# Patient Record
Sex: MALE | Race: OTHER | NOT HISPANIC OR LATINO
[De-identification: names, ages, dates, MRNs, and addresses within clinical notes are randomized per-mention and may not be internally consistent; named-entity substitution may affect disease eponyms.]

---

## 2019-07-15 PROBLEM — Z00.00 ENCOUNTER FOR PREVENTIVE HEALTH EXAMINATION: Status: ACTIVE | Noted: 2019-07-15

## 2019-07-22 ENCOUNTER — APPOINTMENT (OUTPATIENT)
Dept: CT IMAGING | Facility: HOSPITAL | Age: 74
End: 2019-07-22
Payer: MEDICARE

## 2019-07-22 ENCOUNTER — OUTPATIENT (OUTPATIENT)
Dept: OUTPATIENT SERVICES | Facility: HOSPITAL | Age: 74
LOS: 1 days | End: 2019-07-22
Payer: MEDICARE

## 2019-07-22 PROCEDURE — 75574 CT ANGIO HRT W/3D IMAGE: CPT

## 2019-07-22 PROCEDURE — 75574 CT ANGIO HRT W/3D IMAGE: CPT | Mod: 26

## 2019-07-31 VITALS
HEIGHT: 67 IN | TEMPERATURE: 98 F | DIASTOLIC BLOOD PRESSURE: 67 MMHG | SYSTOLIC BLOOD PRESSURE: 122 MMHG | WEIGHT: 214.95 LBS | RESPIRATION RATE: 16 BRPM | HEART RATE: 52 BPM | OXYGEN SATURATION: 97 %

## 2019-07-31 RX ORDER — CHLORHEXIDINE GLUCONATE 213 G/1000ML
1 SOLUTION TOPICAL ONCE
Refills: 0 | Status: DISCONTINUED | OUTPATIENT
Start: 2019-08-01 | End: 2019-08-01

## 2019-07-31 NOTE — H&P ADULT - NSHPLABSRESULTS_GEN_ALL_CORE
13.3   5.76  )-----------( 188      ( 01 Aug 2019 11:49 )             41.0     143  |  107  |  17  ----------------------------<  127<H>  3.8   |  24  |  1.11    Ca    10.0      01 Aug 2019 11:49    TPro  7.8  /  Alb  4.3  /  TBili  0.7  /  DBili  x   /  AST  42<H>  /  ALT  57<H>  /  AlkPhos  84  08-01    PT/INR - ( 01 Aug 2019 11:49 )   PT: 12.8 sec;   INR: 1.13        PTT - ( 01 Aug 2019 11:49 )  PTT:32.7 sec    CARDIAC MARKERS ( 01 Aug 2019 11:49 )  x     / x     / 211 U/L / x     / 3.2 ng/mL    Hemoglobin A1C, Whole Blood: 6.6 % (08-01-19 @ 11:49)    EKG: NSR at 50bpm, T wave inversion in V1, no ST elevations noted.

## 2019-07-31 NOTE — H&P ADULT - NSICDXPASTMEDICALHX_GEN_ALL_CORE_FT
PAST MEDICAL HISTORY:  CAD, multiple vessel     DM (diabetes mellitus)     GERD (gastroesophageal reflux disease)     History of BPH     HLD (hyperlipidemia)     HTN (hypertension)     JOVANNY on CPAP

## 2019-07-31 NOTE — H&P ADULT - HISTORY OF PRESENT ILLNESS
please confirm medications on arrival  72 y/o Male with a FHx of CAD and a PMHx of HTN, HLD, DM-2, GERD, JOVANNY (on CPAP at home), BPH s/p Alight Scraping, PTSD, and CAD who presented to his Cardiologist, Dr. Guillaume, with c/o sharp Left sided chest pain with associated Right arm paresthesias, palpitations, dyspnea on exertion (upon ambulation of one flight/blocks), and h/o syncopal episodes (most recently a year ago), with subsequent CTA Coronaries 7/22/2019: revealing LM: 50%, pLCx: severe calcific stenosis at Ostium, OM2: severe stenosis at Ostium, LAD: moderate, mLCx: small, OM1: minimal stenosis high takeoff, pRCA: mild, mRCA: moderate, dRCA: mild, RPDA: minimal, RPL: normal, Calcium Score 1145 (94%). Patient denies diaphoresis, fatigue, dizziness, lower extremity edema, orthopnea, positional nocturnal dyspnea, recent fever, chills, night sweats, nausea, vomiting, and diarrhea. In light of patient's risk factors, CCS Class III symptoms, and abnormal CTA Coronaries, patient is now referred to [facility] for Cardiac Catheterization with possible intervention if clinically indicated. 72 y/o Male with a FHx of CAD and a PMHx of HTN, HLD, DM-2, GERD, JOVANNY (on CPAP at home), BPH s/p Alight Scraping, PTSD, and CAD who presented to his Cardiologist, Dr. Guillaume, with c/o sharp Left sided chest pain with associated Right arm paresthesias, palpitations, dyspnea on exertion (upon ambulation of one flight/blocks), and h/o syncopal episodes (most recently a year ago), with subsequent CTA Coronaries 7/22/2019: revealing LM: 50%, pLCx: severe calcific stenosis at Ostium, OM2: severe stenosis at Ostium, LAD: moderate, mLCx: small, OM1: minimal stenosis high takeoff, pRCA: mild, mRCA: moderate, dRCA: mild, RPDA: minimal, RPL: normal, Calcium Score 1145 (94%). Patient denies diaphoresis, fatigue, dizziness, lower extremity edema, orthopnea, positional nocturnal dyspnea, recent fever, chills, night sweats, nausea, vomiting, and diarrhea. In light of patient's risk factors, CCS Class III symptoms, and abnormal CTA Coronaries, patient is now referred to [facility] for Cardiac Catheterization with possible intervention if clinically indicated. n/a

## 2019-07-31 NOTE — H&P ADULT - ASSESSMENT
H/H 13.3/41, patient denies bleeding, GI bleeding, hematemesis, hematuria, BRBPR, and melena. Patient reports compliance with home Aspirin 81mg daily, Plavix 600mg and Aspirin 243mg ordered pre-Cath.    Cr. 1.11, euvolemic, IV hydration 9% NS @ 75cc/hr ordered pre-Cath.    ASA Class III    Risks & benefits of procedure and alternative therapy have been explained to the patient including but not limited to: allergic reaction, bleeding with possible need for blood transfusion, infection, renal and vascular compromise, limb damage, arrhythmia, stroke, vessel dissection/perforation, Myocardial Infarction, emergent CABG. Informed consent obtained and in chart.    Patient a candidate for sedation: Yes    Sedation Type: Moderate 72 y/o Male with a FHx of CAD and a PMHx of HTN, HLD, DM-2, GERD, JOVANNY (on CPAP at home), BPH s/p Alight Scraping, PTSD, and CAD who presented to his Cardiologist with CCS Class III symptoms with subsequent CTA Coronaries and now presents for Cardiac Catheterization with possible intervention if clinically indicated.    H/H 13.3/41, patient denies bleeding, GI bleeding, hematemesis, hematuria, BRBPR, and melena. Patient reports compliance with home Aspirin 81mg daily, Plavix 600mg and Aspirin 243mg ordered pre-Cath.    Cr. 1.11, euvolemic, IV hydration 9% NS @ 75cc/hr ordered pre-Cath.    ASA Class III    Risks & benefits of procedure and alternative therapy have been explained to the patient including but not limited to: allergic reaction, bleeding with possible need for blood transfusion, infection, renal and vascular compromise, limb damage, arrhythmia, stroke, vessel dissection/perforation, Myocardial Infarction, emergent CABG. Informed consent obtained and in chart.    Patient a candidate for sedation: Yes    Sedation Type: Moderate

## 2019-08-01 ENCOUNTER — INPATIENT (INPATIENT)
Facility: HOSPITAL | Age: 74
LOS: 0 days | Discharge: HOME | DRG: 247 | End: 2019-08-02
Attending: INTERNAL MEDICINE | Admitting: INTERNAL MEDICINE
Payer: COMMERCIAL

## 2019-08-01 DIAGNOSIS — Z98.52 VASECTOMY STATUS: Chronic | ICD-10-CM

## 2019-08-01 DIAGNOSIS — I25.110 ATHEROSCLEROTIC HEART DISEASE OF NATIVE CORONARY ARTERY WITH UNSTABLE ANGINA PECTORIS: ICD-10-CM

## 2019-08-01 DIAGNOSIS — Z82.49 FAMILY HISTORY OF ISCHEMIC HEART DISEASE AND OTHER DISEASES OF THE CIRCULATORY SYSTEM: ICD-10-CM

## 2019-08-01 DIAGNOSIS — N40.0 BENIGN PROSTATIC HYPERPLASIA WITHOUT LOWER URINARY TRACT SYMPTOMS: ICD-10-CM

## 2019-08-01 DIAGNOSIS — Z90.89 ACQUIRED ABSENCE OF OTHER ORGANS: Chronic | ICD-10-CM

## 2019-08-01 DIAGNOSIS — E11.9 TYPE 2 DIABETES MELLITUS WITHOUT COMPLICATIONS: ICD-10-CM

## 2019-08-01 DIAGNOSIS — Z79.82 LONG TERM (CURRENT) USE OF ASPIRIN: ICD-10-CM

## 2019-08-01 DIAGNOSIS — Z99.89 DEPENDENCE ON OTHER ENABLING MACHINES AND DEVICES: ICD-10-CM

## 2019-08-01 DIAGNOSIS — E78.5 HYPERLIPIDEMIA, UNSPECIFIED: ICD-10-CM

## 2019-08-01 DIAGNOSIS — K21.9 GASTRO-ESOPHAGEAL REFLUX DISEASE WITHOUT ESOPHAGITIS: ICD-10-CM

## 2019-08-01 DIAGNOSIS — F43.10 POST-TRAUMATIC STRESS DISORDER, UNSPECIFIED: ICD-10-CM

## 2019-08-01 DIAGNOSIS — I10 ESSENTIAL (PRIMARY) HYPERTENSION: ICD-10-CM

## 2019-08-01 DIAGNOSIS — G47.33 OBSTRUCTIVE SLEEP APNEA (ADULT) (PEDIATRIC): ICD-10-CM

## 2019-08-01 LAB
ALBUMIN SERPL ELPH-MCNC: 4.3 G/DL — SIGNIFICANT CHANGE UP (ref 3.3–5)
ALP SERPL-CCNC: 84 U/L — SIGNIFICANT CHANGE UP (ref 40–120)
ALT FLD-CCNC: 57 U/L — HIGH (ref 10–45)
ANION GAP SERPL CALC-SCNC: 12 MMOL/L — SIGNIFICANT CHANGE UP (ref 5–17)
APTT BLD: 32.7 SEC — SIGNIFICANT CHANGE UP (ref 27.5–36.3)
AST SERPL-CCNC: 42 U/L — HIGH (ref 10–40)
BASOPHILS # BLD AUTO: 0.01 K/UL — SIGNIFICANT CHANGE UP (ref 0–0.2)
BASOPHILS NFR BLD AUTO: 0.2 % — SIGNIFICANT CHANGE UP (ref 0–2)
BILIRUB SERPL-MCNC: 0.7 MG/DL — SIGNIFICANT CHANGE UP (ref 0.2–1.2)
BUN SERPL-MCNC: 17 MG/DL — SIGNIFICANT CHANGE UP (ref 7–23)
CALCIUM SERPL-MCNC: 10 MG/DL — SIGNIFICANT CHANGE UP (ref 8.4–10.5)
CHLORIDE SERPL-SCNC: 107 MMOL/L — SIGNIFICANT CHANGE UP (ref 96–108)
CHOLEST SERPL-MCNC: 122 MG/DL — SIGNIFICANT CHANGE UP (ref 10–199)
CK MB CFR SERPL CALC: 3.2 NG/ML — SIGNIFICANT CHANGE UP (ref 0–6.7)
CK SERPL-CCNC: 211 U/L — HIGH (ref 30–200)
CO2 SERPL-SCNC: 24 MMOL/L — SIGNIFICANT CHANGE UP (ref 22–31)
CREAT SERPL-MCNC: 1.11 MG/DL — SIGNIFICANT CHANGE UP (ref 0.5–1.3)
CRP SERPL-MCNC: 0.1 MG/DL — SIGNIFICANT CHANGE UP (ref 0–0.4)
EOSINOPHIL # BLD AUTO: 0.07 K/UL — SIGNIFICANT CHANGE UP (ref 0–0.5)
EOSINOPHIL NFR BLD AUTO: 1.2 % — SIGNIFICANT CHANGE UP (ref 0–6)
GLUCOSE BLDC GLUCOMTR-MCNC: 100 MG/DL — HIGH (ref 70–99)
GLUCOSE BLDC GLUCOMTR-MCNC: 118 MG/DL — HIGH (ref 70–99)
GLUCOSE SERPL-MCNC: 127 MG/DL — HIGH (ref 70–99)
HBA1C BLD-MCNC: 6.6 % — HIGH (ref 4–5.6)
HCT VFR BLD CALC: 41 % — SIGNIFICANT CHANGE UP (ref 39–50)
HDLC SERPL-MCNC: 35 MG/DL — LOW
HGB BLD-MCNC: 13.3 G/DL — SIGNIFICANT CHANGE UP (ref 13–17)
IMM GRANULOCYTES NFR BLD AUTO: 0.3 % — SIGNIFICANT CHANGE UP (ref 0–1.5)
INR BLD: 1.13 — SIGNIFICANT CHANGE UP (ref 0.88–1.16)
LIPID PNL WITH DIRECT LDL SERPL: 57 MG/DL — SIGNIFICANT CHANGE UP
LYMPHOCYTES # BLD AUTO: 1.3 K/UL — SIGNIFICANT CHANGE UP (ref 1–3.3)
LYMPHOCYTES # BLD AUTO: 22.6 % — SIGNIFICANT CHANGE UP (ref 13–44)
MCHC RBC-ENTMCNC: 31.1 PG — SIGNIFICANT CHANGE UP (ref 27–34)
MCHC RBC-ENTMCNC: 32.4 GM/DL — SIGNIFICANT CHANGE UP (ref 32–36)
MCV RBC AUTO: 95.8 FL — SIGNIFICANT CHANGE UP (ref 80–100)
MONOCYTES # BLD AUTO: 0.31 K/UL — SIGNIFICANT CHANGE UP (ref 0–0.9)
MONOCYTES NFR BLD AUTO: 5.4 % — SIGNIFICANT CHANGE UP (ref 2–14)
NEUTROPHILS # BLD AUTO: 4.05 K/UL — SIGNIFICANT CHANGE UP (ref 1.8–7.4)
NEUTROPHILS NFR BLD AUTO: 70.3 % — SIGNIFICANT CHANGE UP (ref 43–77)
NRBC # BLD: 0 /100 WBCS — SIGNIFICANT CHANGE UP (ref 0–0)
PLATELET # BLD AUTO: 188 K/UL — SIGNIFICANT CHANGE UP (ref 150–400)
POTASSIUM SERPL-MCNC: 3.8 MMOL/L — SIGNIFICANT CHANGE UP (ref 3.5–5.3)
POTASSIUM SERPL-SCNC: 3.8 MMOL/L — SIGNIFICANT CHANGE UP (ref 3.5–5.3)
PROT SERPL-MCNC: 7.8 G/DL — SIGNIFICANT CHANGE UP (ref 6–8.3)
PROTHROM AB SERPL-ACNC: 12.8 SEC — SIGNIFICANT CHANGE UP (ref 10–12.9)
RBC # BLD: 4.28 M/UL — SIGNIFICANT CHANGE UP (ref 4.2–5.8)
RBC # FLD: 13.3 % — SIGNIFICANT CHANGE UP (ref 10.3–14.5)
SODIUM SERPL-SCNC: 143 MMOL/L — SIGNIFICANT CHANGE UP (ref 135–145)
TOTAL CHOLESTEROL/HDL RATIO MEASUREMENT: 3.5 RATIO — SIGNIFICANT CHANGE UP (ref 3.4–9.6)
TRIGL SERPL-MCNC: 150 MG/DL — HIGH (ref 10–149)
WBC # BLD: 5.76 K/UL — SIGNIFICANT CHANGE UP (ref 3.8–10.5)
WBC # FLD AUTO: 5.76 K/UL — SIGNIFICANT CHANGE UP (ref 3.8–10.5)

## 2019-08-01 PROCEDURE — 93571 IV DOP VEL&/PRESS C FLO 1ST: CPT | Mod: 26,LC

## 2019-08-01 PROCEDURE — 92929: CPT | Mod: LC

## 2019-08-01 PROCEDURE — 93010 ELECTROCARDIOGRAM REPORT: CPT

## 2019-08-01 PROCEDURE — 93458 L HRT ARTERY/VENTRICLE ANGIO: CPT | Mod: 26,59

## 2019-08-01 PROCEDURE — 92928 PRQ TCAT PLMT NTRAC ST 1 LES: CPT | Mod: LC

## 2019-08-01 RX ORDER — DEXTROSE 50 % IN WATER 50 %
25 SYRINGE (ML) INTRAVENOUS ONCE
Refills: 0 | Status: DISCONTINUED | OUTPATIENT
Start: 2019-08-01 | End: 2019-08-01

## 2019-08-01 RX ORDER — ALBUTEROL 90 UG/1
2 AEROSOL, METERED ORAL EVERY 6 HOURS
Refills: 0 | Status: DISCONTINUED | OUTPATIENT
Start: 2019-08-01 | End: 2019-08-02

## 2019-08-01 RX ORDER — GABAPENTIN 400 MG/1
400 CAPSULE ORAL DAILY
Refills: 0 | Status: DISCONTINUED | OUTPATIENT
Start: 2019-08-01 | End: 2019-08-02

## 2019-08-01 RX ORDER — ALLOPURINOL 300 MG
100 TABLET ORAL DAILY
Refills: 0 | Status: DISCONTINUED | OUTPATIENT
Start: 2019-08-01 | End: 2019-08-02

## 2019-08-01 RX ORDER — ALBUTEROL 90 UG/1
2 AEROSOL, METERED ORAL
Qty: 0 | Refills: 0 | DISCHARGE

## 2019-08-01 RX ORDER — SODIUM CHLORIDE 9 MG/ML
1000 INJECTION, SOLUTION INTRAVENOUS
Refills: 0 | Status: DISCONTINUED | OUTPATIENT
Start: 2019-08-01 | End: 2019-08-01

## 2019-08-01 RX ORDER — ACETAMINOPHEN 500 MG
650 TABLET ORAL ONCE
Refills: 0 | Status: COMPLETED | OUTPATIENT
Start: 2019-08-01 | End: 2019-08-02

## 2019-08-01 RX ORDER — ATENOLOL 25 MG/1
25 TABLET ORAL DAILY
Refills: 0 | Status: DISCONTINUED | OUTPATIENT
Start: 2019-08-01 | End: 2019-08-02

## 2019-08-01 RX ORDER — ZOLPIDEM TARTRATE 10 MG/1
5 TABLET ORAL AT BEDTIME
Refills: 0 | Status: DISCONTINUED | OUTPATIENT
Start: 2019-08-01 | End: 2019-08-02

## 2019-08-01 RX ORDER — PANTOPRAZOLE SODIUM 20 MG/1
1 TABLET, DELAYED RELEASE ORAL
Qty: 0 | Refills: 0 | DISCHARGE

## 2019-08-01 RX ORDER — SODIUM CHLORIDE 9 MG/ML
500 INJECTION INTRAMUSCULAR; INTRAVENOUS; SUBCUTANEOUS
Refills: 0 | Status: DISCONTINUED | OUTPATIENT
Start: 2019-08-01 | End: 2019-08-02

## 2019-08-01 RX ORDER — SERTRALINE 25 MG/1
2 TABLET, FILM COATED ORAL
Qty: 0 | Refills: 0 | DISCHARGE

## 2019-08-01 RX ORDER — TIZANIDINE 4 MG/1
4 TABLET ORAL
Refills: 0 | Status: DISCONTINUED | OUTPATIENT
Start: 2019-08-01 | End: 2019-08-02

## 2019-08-01 RX ORDER — SERTRALINE 25 MG/1
1 TABLET, FILM COATED ORAL
Qty: 0 | Refills: 0 | DISCHARGE

## 2019-08-01 RX ORDER — TAMSULOSIN HYDROCHLORIDE 0.4 MG/1
0.4 CAPSULE ORAL AT BEDTIME
Refills: 0 | Status: DISCONTINUED | OUTPATIENT
Start: 2019-08-01 | End: 2019-08-02

## 2019-08-01 RX ORDER — GLUCAGON INJECTION, SOLUTION 0.5 MG/.1ML
1 INJECTION, SOLUTION SUBCUTANEOUS ONCE
Refills: 0 | Status: DISCONTINUED | OUTPATIENT
Start: 2019-08-01 | End: 2019-08-02

## 2019-08-01 RX ORDER — SODIUM CHLORIDE 9 MG/ML
1000 INJECTION, SOLUTION INTRAVENOUS
Refills: 0 | Status: DISCONTINUED | OUTPATIENT
Start: 2019-08-01 | End: 2019-08-02

## 2019-08-01 RX ORDER — CLOPIDOGREL BISULFATE 75 MG/1
600 TABLET, FILM COATED ORAL ONCE
Refills: 0 | Status: COMPLETED | OUTPATIENT
Start: 2019-08-01 | End: 2019-08-01

## 2019-08-01 RX ORDER — DEXTROSE 50 % IN WATER 50 %
15 SYRINGE (ML) INTRAVENOUS ONCE
Refills: 0 | Status: DISCONTINUED | OUTPATIENT
Start: 2019-08-01 | End: 2019-08-02

## 2019-08-01 RX ORDER — ALLOPURINOL 300 MG
1 TABLET ORAL
Qty: 0 | Refills: 0 | DISCHARGE

## 2019-08-01 RX ORDER — TAMSULOSIN HYDROCHLORIDE 0.4 MG/1
1 CAPSULE ORAL
Qty: 0 | Refills: 0 | DISCHARGE

## 2019-08-01 RX ORDER — DEXTROSE 50 % IN WATER 50 %
12.5 SYRINGE (ML) INTRAVENOUS ONCE
Refills: 0 | Status: DISCONTINUED | OUTPATIENT
Start: 2019-08-01 | End: 2019-08-02

## 2019-08-01 RX ORDER — CLOPIDOGREL BISULFATE 75 MG/1
75 TABLET, FILM COATED ORAL DAILY
Refills: 0 | Status: DISCONTINUED | OUTPATIENT
Start: 2019-08-02 | End: 2019-08-02

## 2019-08-01 RX ORDER — GABAPENTIN 400 MG/1
1 CAPSULE ORAL
Qty: 0 | Refills: 0 | DISCHARGE

## 2019-08-01 RX ORDER — ASPIRIN/CALCIUM CARB/MAGNESIUM 324 MG
81 TABLET ORAL DAILY
Refills: 0 | Status: DISCONTINUED | OUTPATIENT
Start: 2019-08-02 | End: 2019-08-02

## 2019-08-01 RX ORDER — ATORVASTATIN CALCIUM 80 MG/1
1 TABLET, FILM COATED ORAL
Qty: 0 | Refills: 0 | DISCHARGE

## 2019-08-01 RX ORDER — ALPROSTADIL 125 MCG
1 SUPPOSITORY, URETHRAL URETHRAL
Qty: 0 | Refills: 0 | DISCHARGE

## 2019-08-01 RX ORDER — TIZANIDINE 4 MG/1
1 TABLET ORAL
Qty: 0 | Refills: 0 | DISCHARGE

## 2019-08-01 RX ORDER — LISINOPRIL 2.5 MG/1
20 TABLET ORAL DAILY
Refills: 0 | Status: DISCONTINUED | OUTPATIENT
Start: 2019-08-02 | End: 2019-08-02

## 2019-08-01 RX ORDER — PANTOPRAZOLE SODIUM 20 MG/1
40 TABLET, DELAYED RELEASE ORAL
Refills: 0 | Status: DISCONTINUED | OUTPATIENT
Start: 2019-08-01 | End: 2019-08-02

## 2019-08-01 RX ORDER — SODIUM CHLORIDE 9 MG/ML
1000 INJECTION INTRAMUSCULAR; INTRAVENOUS; SUBCUTANEOUS
Refills: 0 | Status: DISCONTINUED | OUTPATIENT
Start: 2019-08-01 | End: 2019-08-01

## 2019-08-01 RX ORDER — ZOLPIDEM TARTRATE 10 MG/1
1 TABLET ORAL
Qty: 0 | Refills: 0 | DISCHARGE

## 2019-08-01 RX ORDER — CHOLECALCIFEROL (VITAMIN D3) 125 MCG
1 CAPSULE ORAL
Qty: 0 | Refills: 0 | DISCHARGE

## 2019-08-01 RX ORDER — DEXTROSE 50 % IN WATER 50 %
12.5 SYRINGE (ML) INTRAVENOUS ONCE
Refills: 0 | Status: DISCONTINUED | OUTPATIENT
Start: 2019-08-01 | End: 2019-08-01

## 2019-08-01 RX ORDER — DEXTROSE 50 % IN WATER 50 %
15 SYRINGE (ML) INTRAVENOUS ONCE
Refills: 0 | Status: DISCONTINUED | OUTPATIENT
Start: 2019-08-01 | End: 2019-08-01

## 2019-08-01 RX ORDER — HYDROCHLOROTHIAZIDE 25 MG
25 TABLET ORAL DAILY
Refills: 0 | Status: DISCONTINUED | OUTPATIENT
Start: 2019-08-02 | End: 2019-08-02

## 2019-08-01 RX ORDER — DEXTROSE 50 % IN WATER 50 %
25 SYRINGE (ML) INTRAVENOUS ONCE
Refills: 0 | Status: DISCONTINUED | OUTPATIENT
Start: 2019-08-01 | End: 2019-08-02

## 2019-08-01 RX ORDER — ATORVASTATIN CALCIUM 80 MG/1
40 TABLET, FILM COATED ORAL AT BEDTIME
Refills: 0 | Status: DISCONTINUED | OUTPATIENT
Start: 2019-08-01 | End: 2019-08-02

## 2019-08-01 RX ORDER — GLUCAGON INJECTION, SOLUTION 0.5 MG/.1ML
1 INJECTION, SOLUTION SUBCUTANEOUS ONCE
Refills: 0 | Status: DISCONTINUED | OUTPATIENT
Start: 2019-08-01 | End: 2019-08-01

## 2019-08-01 RX ORDER — INSULIN LISPRO 100/ML
VIAL (ML) SUBCUTANEOUS ONCE
Refills: 0 | Status: DISCONTINUED | OUTPATIENT
Start: 2019-08-01 | End: 2019-08-01

## 2019-08-01 RX ORDER — INSULIN LISPRO 100/ML
VIAL (ML) SUBCUTANEOUS
Refills: 0 | Status: DISCONTINUED | OUTPATIENT
Start: 2019-08-01 | End: 2019-08-02

## 2019-08-01 RX ORDER — ATENOLOL 25 MG/1
1 TABLET ORAL
Qty: 0 | Refills: 0 | DISCHARGE

## 2019-08-01 RX ORDER — ASPIRIN/CALCIUM CARB/MAGNESIUM 324 MG
81 TABLET ORAL DAILY
Refills: 0 | Status: DISCONTINUED | OUTPATIENT
Start: 2019-08-01 | End: 2019-08-01

## 2019-08-01 RX ORDER — ASPIRIN/CALCIUM CARB/MAGNESIUM 324 MG
243 TABLET ORAL ONCE
Refills: 0 | Status: COMPLETED | OUTPATIENT
Start: 2019-08-01 | End: 2019-08-01

## 2019-08-01 RX ORDER — SERTRALINE 25 MG/1
200 TABLET, FILM COATED ORAL DAILY
Refills: 0 | Status: DISCONTINUED | OUTPATIENT
Start: 2019-08-01 | End: 2019-08-02

## 2019-08-01 RX ADMIN — ZOLPIDEM TARTRATE 5 MILLIGRAM(S): 10 TABLET ORAL at 22:33

## 2019-08-01 RX ADMIN — CLOPIDOGREL BISULFATE 600 MILLIGRAM(S): 75 TABLET, FILM COATED ORAL at 13:23

## 2019-08-01 RX ADMIN — TAMSULOSIN HYDROCHLORIDE 0.4 MILLIGRAM(S): 0.4 CAPSULE ORAL at 22:33

## 2019-08-01 RX ADMIN — Medication 243 MILLIGRAM(S): at 13:23

## 2019-08-01 RX ADMIN — SODIUM CHLORIDE 75 MILLILITER(S): 9 INJECTION INTRAMUSCULAR; INTRAVENOUS; SUBCUTANEOUS at 13:23

## 2019-08-01 RX ADMIN — ATORVASTATIN CALCIUM 40 MILLIGRAM(S): 80 TABLET, FILM COATED ORAL at 22:33

## 2019-08-01 NOTE — CONSULT NOTE ADULT - SUBJECTIVE AND OBJECTIVE BOX
Preventive Cardiology Consultation Note    Cardiologist - Dr. Forman     CHIEF COMPLAINT: s/p cardiac catheterization requiring cardiovascular prevention optimization and education    HISTORY OF PRESENT ILLNESS: 72 y/o Male with a FHx of CAD and a PMHx of HTN, HLD, DM-2, GERD, JOVANNY (on CPAP at home), BPH s/p Alight Scraping, PTSD, and CAD. CTA Coronaries 7/22/2019: revealing LM: 50%, pLCx: severe calcific stenosis at Ostium, OM2: severe stenosis at Ostium, LAD: moderate, mLCx: small, OM1: minimal stenosis high takeoff, pRCA: mild, mRCA: moderate, dRCA: mild, RPDA: minimal, RPL: normal, Calcium Score 1145 (94%). Patient is now s/p cardiac cath today 8/1/19    Review of systems otherwise negative.     Lifestyle History:  Mediterranean Diet Score (9 question survey) was 4.   (8-9: optimal, 6-7: near-optimal, 4-5: suboptimal, 0-3: markedly suboptimal)  Exercise: Patient denies any regular exercise other than walking necessary for daily activities.   Smoking: Patient denies any history of smoking.   Stress: Patient denies any stress.     PAST MEDICAL & SURGICAL HISTORY:  CAD, multiple vessel  History of BPH  JOVANNY on CPAP  GERD (gastroesophageal reflux disease)  DM (diabetes mellitus)  HLD (hyperlipidemia)  HTN (hypertension)  H/O uvulectomy  H/O vasectomy    FAMILY HISTORY:   MI (myocardial infarction), father.    Allergies:   No Known Allergies      HOME MEDICATIONS:  albuterol 90 mcg/inh inhalation aerosol with adapter: 2 puff(s) inhaled 4 times a day, As Needed (01 Aug 2019 12:21)  allopurinol 100 mg oral tablet: 1 tab(s) orally once a day (01 Aug 2019 12:21)  alprostadil 40 mcg injection: 1 unit(s) injectable 3 times a week, As Needed (01 Aug 2019 12:21)  Aspirin Enteric Coated 81 mg oral delayed release tablet: 1 tab(s) orally once a day (01 Aug 2019 12:21)  atenolol 25 mg oral tablet: 1 tab(s) orally once a day (01 Aug 2019 12:21)  atorvastatin 40 mg oral tablet: 1 tab(s) orally once a day (at bedtime) (01 Aug 2019 12:21)  Calcium 600+D 600 mg-200 intl units oral tablet: 1 tab(s) orally 2 times a day (01 Aug 2019 12:21)  cholecalciferol 2000 intl units oral tablet: 1 tab(s) orally once a day (01 Aug 2019 12:21)  gabapentin 400 mg oral capsule: 1 cap(s) orally once a day (01 Aug 2019 12:21)  lisinopril-hydrochlorothiazide 20 mg-25 mg oral tablet: 1 tab(s) orally once a day (01 Aug 2019 12:21)  metFORMIN 500 mg oral tablet: 1 tab(s) orally 2 times a day (01 Aug 2019 12:21)  pantoprazole 40 mg oral delayed release tablet: 1 tab(s) orally once a day (01 Aug 2019 12:21)  sertraline 100 mg oral tablet: 2 tab(s) orally once a day (01 Aug 2019 12:21)  tamsulosin 0.4 mg oral capsule: 1 cap(s) orally once a day (at bedtime) (01 Aug 2019 12:21)  tiZANidine 4 mg oral tablet: 1 tab(s) orally 2 times a day, As Needed (01 Aug 2019 12:21)  zolpidem 10 mg oral tablet: 1 tab(s) orally once a day (at bedtime) (01 Aug 2019 12:21)      PHYSICAL EXAM:  · Temp (F)	98 Degrees F	  · Temp (C)	36.7 Degrees C	  · Heart Rate	  52 /min	  · Respiration Rate (breaths/min)	16 /min	  · BP Systolic	122 mm Hg	  · BP Diastolic	67 mm Hg	  · Blood Pressure - Method	electronic	  · BP Noninvasive Mean	85 mm Hg	  · SpO2 (%)	97 %	  · O2 delivery	room air	    Height (cm): 170.18 (08-01 @ 12:25)  Weight (kg): 97.5 (08-01 @ 12:25)  BMI (kg/m2): 33.7 (08-01 @ 12:25)  	  Gen- awake, conversive  Head-NCAT; eyes: no corneal arcus noted b/l; no xanthelasmas   Neck- no thyromegaly, no cervical LAD, no JVD, no carotid bruit b/l  Respiratory- good air entry b/l, no WRR  Cardiovascular- S1S2, RRR, no MRG appr, no LE edema b/l, distal pulses 2+ b/l  Gastrointestinal- no HSM, no masses  Neurology- moves all extremities, no focal deficits  Psych- normal affect, non-depressed mood  Skin- no lesions, no rashes, no xanthomas     LABS:	                        13.3   5.76  )-----------( 188      ( 01 Aug 2019 11:49 )             41.0     08-01    143  |  107  |  17  ----------------------------<  127<H>  3.8   |  24  |  1.11    Ca    10.0      01 Aug 2019 11:49    TPro  7.8  /  Alb  4.3  /  TBili  0.7  /  DBili  x   /  AST  42<H>  /  ALT  57<H>  /  AlkPhos  84  08-01      Hemoglobin A1C, Whole Blood: 6.6 % (08-01 @ 11:49)      Cholesterol, Serum: 122 mg/dL (08-01 @ 11:49)  HDL Cholesterol, Serum: 35 mg/dL (08-01 @ 11:49)  Triglycerides, Serum: 150 mg/dL (08-01 @ 11:49)  Direct LDL: 57 mg/dL (08-01 @ 11:49)    C-Reactive Protein, Serum: 0.10 mg/dL (08-01 @ 11:49)      ASSESSMENT/RECOMMENDATIONS: 	  Patient's dietary, exercise and overall lifestyle habits were reviewed. The concept of atherosclerosis and its systemic nature was discussed with a focus on the need to get all cardiovascular risk factors to goal. At this time, I would like to make the following recommendations to optimize atherosclerotic risk factors.     RECOMMENDATIONS:   Anti-platelet Therapy: DAPT per interventionalist recommendation.   Lipid Therapy: Patient is currently taking atorvastatin 40mg daily and is compliant and tolerating it well. We believe this is an appropriate medication for him at this time, as his current LDL level is at goal and lifestyle modifications will likely benefit him further. Depending on improvements from lifestyle modifications, it would be reasonable to consider either increasing the statin dosage and/or adding Zetia 10mg daily to further optimize his medication regimen, if needed in the future to keep his LDL level <70 mg/dL.   Hypertension: Blood pressures during this stay were well-controlled.   Mediterranean Diet Score is 4. Some suggestions include continue incorporating 2 or more servings per day of vegetables, fruits, and whole grains. Increase intake of fish and legumes/beans to 2 or more servings per week. Aim to increase intake of healthy fats, such as olive oil and avocados, and have a handful of nuts/seeds most days. Reduce red/processed meat consumption to 2 or fewer times per week.   Exercise: Recommended gradually increasing activity to 30-45 minutes most days of the week once cleared by referring cardiologist. Cardiac rehab might benefit this patient and is covered by major insurance plans (other than co-pays), please refer.   Medication Adherence: Patient has no issues with adherence at this time.   Smoking: This patient is a non-smoker.   Obesity/Overweight: The patient was advised about specific mechanisms such as reduced portions and increased activity for efforts toward weight loss.   Glucometabolic State: Patient's blood sugar is well-controlled on the current regimen at this time. Recent HbA1c was 6.6%. Depending on discussions with patient's PCP and/or endocrinologist, if medication adjustments are needed in the future, we would recommend the possibility of a GLP-1 agonist or SGLT-2 inhibitor, as these newer agents have cardiovascular benefits as well as glucose control.   Sleep Apnea: This patient has known JOVANNY and reports compliance with CPAP nightly.   Psychological Stress: The patient appears to be coping with stressors well at this time.     Thank you for the opportunity to see this patient. Please feel free to contact Prevention if there are any questions, or if you feel that your patient would benefit from continued follow-up visits with the Program.    I am acting as a scribe on behalf of Dr. Faye Mcdaniel,    Shefali Bustillos, [facility]  Cardiovascular Prevention     Faye Mcdaniel MD  System Director, Cardiovascular Prevention Preventive Cardiology Consultation Note    Cardiologist - Dr. Forman     CHIEF COMPLAINT: s/p cardiac catheterization requiring cardiovascular prevention optimization and education    HISTORY OF PRESENT ILLNESS: 74 y/o Male with a FHx of CAD and a PMHx of HTN, HLD, DM-2, GERD, JOVANNY (on CPAP at home), BPH s/p Alight Scraping, PTSD, and CAD. CTA Coronaries 7/22/2019: revealing LM: 50%, pLCx: severe calcific stenosis at Ostium, OM2: severe stenosis at Ostium, LAD: moderate, mLCx: small, OM1: minimal stenosis high takeoff, pRCA: mild, mRCA: moderate, dRCA: mild, RPDA: minimal, RPL: normal, Calcium Score 1145 (94%). Patient is now s/p cardiac cath today 8/1/19: LELA to ostial LCx and OM2. Residual mLAD 50%, dRCA 30%.     Review of systems otherwise negative.     Lifestyle History:  Mediterranean Diet Score (9 question survey) was 4.   (8-9: optimal, 6-7: near-optimal, 4-5: suboptimal, 0-3: markedly suboptimal)  Exercise: Patient denies any regular exercise other than walking necessary for daily activities.   Smoking: Patient denies any history of smoking.   Stress: Patient denies any stress.     PAST MEDICAL & SURGICAL HISTORY:  CAD, multiple vessel  History of BPH  JOVANNY on CPAP  GERD (gastroesophageal reflux disease)  DM (diabetes mellitus)  HLD (hyperlipidemia)  HTN (hypertension)  H/O uvulectomy  H/O vasectomy    FAMILY HISTORY:   MI (myocardial infarction), father.    Allergies:   No Known Allergies      HOME MEDICATIONS:  albuterol 90 mcg/inh inhalation aerosol with adapter: 2 puff(s) inhaled 4 times a day, As Needed (01 Aug 2019 12:21)  allopurinol 100 mg oral tablet: 1 tab(s) orally once a day (01 Aug 2019 12:21)  alprostadil 40 mcg injection: 1 unit(s) injectable 3 times a week, As Needed (01 Aug 2019 12:21)  Aspirin Enteric Coated 81 mg oral delayed release tablet: 1 tab(s) orally once a day (01 Aug 2019 12:21)  atenolol 25 mg oral tablet: 1 tab(s) orally once a day (01 Aug 2019 12:21)  atorvastatin 40 mg oral tablet: 1 tab(s) orally once a day (at bedtime) (01 Aug 2019 12:21)  Calcium 600+D 600 mg-200 intl units oral tablet: 1 tab(s) orally 2 times a day (01 Aug 2019 12:21)  cholecalciferol 2000 intl units oral tablet: 1 tab(s) orally once a day (01 Aug 2019 12:21)  gabapentin 400 mg oral capsule: 1 cap(s) orally once a day (01 Aug 2019 12:21)  lisinopril-hydrochlorothiazide 20 mg-25 mg oral tablet: 1 tab(s) orally once a day (01 Aug 2019 12:21)  metFORMIN 500 mg oral tablet: 1 tab(s) orally 2 times a day (01 Aug 2019 12:21)  pantoprazole 40 mg oral delayed release tablet: 1 tab(s) orally once a day (01 Aug 2019 12:21)  sertraline 100 mg oral tablet: 2 tab(s) orally once a day (01 Aug 2019 12:21)  tamsulosin 0.4 mg oral capsule: 1 cap(s) orally once a day (at bedtime) (01 Aug 2019 12:21)  tiZANidine 4 mg oral tablet: 1 tab(s) orally 2 times a day, As Needed (01 Aug 2019 12:21)  zolpidem 10 mg oral tablet: 1 tab(s) orally once a day (at bedtime) (01 Aug 2019 12:21)      PHYSICAL EXAM:  · Temp (F)	98 Degrees F	  · Temp (C)	36.7 Degrees C	  · Heart Rate	  52 /min	  · Respiration Rate (breaths/min)	16 /min	  · BP Systolic	122 mm Hg	  · BP Diastolic	67 mm Hg	  · Blood Pressure - Method	electronic	  · BP Noninvasive Mean	85 mm Hg	  · SpO2 (%)	97 %	  · O2 delivery	room air	    Height (cm): 170.18 (08-01 @ 12:25)  Weight (kg): 97.5 (08-01 @ 12:25)  BMI (kg/m2): 33.7 (08-01 @ 12:25)  	  Gen- awake, conversive  Head-NCAT; eyes: no corneal arcus noted b/l; no xanthelasmas   Neck- no thyromegaly, no cervical LAD, no JVD, no carotid bruit b/l  Respiratory- good air entry b/l, no WRR  Cardiovascular- S1S2, RRR, no MRG appr, no LE edema b/l, distal pulses 2+ b/l  Gastrointestinal- no HSM, no masses  Neurology- moves all extremities, no focal deficits  Psych- normal affect, non-depressed mood  Skin- no lesions, no rashes, no xanthomas     LABS:	                        13.3   5.76  )-----------( 188      ( 01 Aug 2019 11:49 )             41.0     08-01    143  |  107  |  17  ----------------------------<  127<H>  3.8   |  24  |  1.11    Ca    10.0      01 Aug 2019 11:49    TPro  7.8  /  Alb  4.3  /  TBili  0.7  /  DBili  x   /  AST  42<H>  /  ALT  57<H>  /  AlkPhos  84  08-01      Hemoglobin A1C, Whole Blood: 6.6 % (08-01 @ 11:49)      Cholesterol, Serum: 122 mg/dL (08-01 @ 11:49)  HDL Cholesterol, Serum: 35 mg/dL (08-01 @ 11:49)  Triglycerides, Serum: 150 mg/dL (08-01 @ 11:49)  Direct LDL: 57 mg/dL (08-01 @ 11:49)    C-Reactive Protein, Serum: 0.10 mg/dL (08-01 @ 11:49)      ASSESSMENT/RECOMMENDATIONS: 	  Patient's dietary, exercise and overall lifestyle habits were reviewed. The concept of atherosclerosis and its systemic nature was discussed with a focus on the need to get all cardiovascular risk factors to goal. At this time, I would like to make the following recommendations to optimize atherosclerotic risk factors.     RECOMMENDATIONS:   Anti-platelet Therapy: DAPT per interventionalist recommendation.   Lipid Therapy: Patient is currently taking atorvastatin 40mg daily and is compliant and tolerating it well. We believe this is an appropriate medication for him at this time, as his current LDL level is at goal and lifestyle modifications will likely benefit him further. Depending on improvements from lifestyle modifications, it would be reasonable to consider either increasing the statin dosage and/or adding Zetia 10mg daily to further optimize his medication regimen, if needed in the future to keep his LDL level <70 mg/dL.   Hypertension: Blood pressures during this stay were well-controlled.   Mediterranean Diet Score is 4. Some suggestions include continue incorporating 2 or more servings per day of vegetables, fruits, and whole grains. Increase intake of fish and legumes/beans to 2 or more servings per week. Aim to increase intake of healthy fats, such as olive oil and avocados, and have a handful of nuts/seeds most days. Reduce red/processed meat consumption to 2 or fewer times per week.   Exercise: Recommended gradually increasing activity to 30-45 minutes most days of the week once cleared by referring cardiologist. Cardiac rehab might benefit this patient and is covered by major insurance plans (other than co-pays), please refer.   Medication Adherence: Patient has no issues with adherence at this time.   Smoking: This patient is a non-smoker.   Obesity/Overweight: The patient was advised about specific mechanisms such as reduced portions and increased activity for efforts toward weight loss.   Glucometabolic State: Patient's blood sugar is well-controlled on the current regimen at this time. Recent HbA1c was 6.6%. Depending on discussions with patient's PCP and/or endocrinologist, if medication adjustments are needed in the future, we would recommend the possibility of a GLP-1 agonist or SGLT-2 inhibitor, as these newer agents have cardiovascular benefits as well as glucose control.   Sleep Apnea: This patient has known JOVANNY and reports compliance with CPAP nightly.   Psychological Stress: The patient appears to be coping with stressors well at this time.     Thank you for the opportunity to see this patient. Please feel free to contact Prevention if there are any questions, or if you feel that your patient would benefit from continued follow-up visits with the Program.    I am acting as a scribe on behalf of Dr. Faye Mcdaniel,    Shefali Bustillos, [facility]  Cardiovascular Prevention     Faye Mcdaniel MD  System Director, Cardiovascular Prevention

## 2019-08-01 NOTE — PROGRESS NOTE ADULT - SUBJECTIVE AND OBJECTIVE BOX
Prelim Cath Report    Conclusion  2 Vessel CAD  Syntax Score low  Frailty score 4    Coronary Angiogram  LMCA: Normal vessel  LAD: 50% mid LAD stenosis  LCx: 80% ostial LCx stenosis, FFR=0.78 (positive for hemodynamic significance)  OM2: 75% ostial OM2 stenosis  RCA: Elian, dominant vessel. 30% distal RCA stenosis    Left Heart Catheterization  LV Gram: EF 55%  LVEDP 7mmHg  No AS, No MR    Intervention  - Successful PCI of 75% OM2 stenosis with a 2.06o80bv Resolute [place] LELA. 0% residual stenosis and excellent angiographic result. ISABELA 3 flow  - Successful PCI of 80% ostial LCx stenosis with a 3.5x12mm Resolute [place] LELA. 0% residual stenosis and excellent angiographic result. ISABELA 3 flow    Radial band placed on Rt radial access site with adequate hemostasis prior to leaving cath lab  No complications     Recommendations  ASA 81mg for life  Plavix 75mg daily for at least 1 year  Optimal medical therapy including high intensity statin  Continue lifestyle and risk factor modification

## 2019-08-02 ENCOUNTER — TRANSCRIPTION ENCOUNTER (OUTPATIENT)
Age: 74
End: 2019-08-02

## 2019-08-02 VITALS — HEART RATE: 58 BPM | RESPIRATION RATE: 16 BRPM | OXYGEN SATURATION: 97 %

## 2019-08-02 LAB
ANION GAP SERPL CALC-SCNC: 11 MMOL/L — SIGNIFICANT CHANGE UP (ref 5–17)
BUN SERPL-MCNC: 17 MG/DL — SIGNIFICANT CHANGE UP (ref 7–23)
CALCIUM SERPL-MCNC: 9 MG/DL — SIGNIFICANT CHANGE UP (ref 8.4–10.5)
CHLORIDE SERPL-SCNC: 109 MMOL/L — HIGH (ref 96–108)
CO2 SERPL-SCNC: 24 MMOL/L — SIGNIFICANT CHANGE UP (ref 22–31)
CREAT SERPL-MCNC: 1.12 MG/DL — SIGNIFICANT CHANGE UP (ref 0.5–1.3)
GLUCOSE SERPL-MCNC: 130 MG/DL — HIGH (ref 70–99)
HBA1C BLD-MCNC: 6.7 % — HIGH (ref 4–5.6)
HCT VFR BLD CALC: 39.8 % — SIGNIFICANT CHANGE UP (ref 39–50)
HCV AB S/CO SERPL IA: 0.07 S/CO — SIGNIFICANT CHANGE UP
HCV AB SERPL-IMP: SIGNIFICANT CHANGE UP
HGB BLD-MCNC: 12.5 G/DL — LOW (ref 13–17)
MAGNESIUM SERPL-MCNC: 1.9 MG/DL — SIGNIFICANT CHANGE UP (ref 1.6–2.6)
MCHC RBC-ENTMCNC: 30.6 PG — SIGNIFICANT CHANGE UP (ref 27–34)
MCHC RBC-ENTMCNC: 31.4 GM/DL — LOW (ref 32–36)
MCV RBC AUTO: 97.5 FL — SIGNIFICANT CHANGE UP (ref 80–100)
NRBC # BLD: 0 /100 WBCS — SIGNIFICANT CHANGE UP (ref 0–0)
PLATELET # BLD AUTO: 167 K/UL — SIGNIFICANT CHANGE UP (ref 150–400)
POTASSIUM SERPL-MCNC: 3.4 MMOL/L — LOW (ref 3.5–5.3)
POTASSIUM SERPL-SCNC: 3.4 MMOL/L — LOW (ref 3.5–5.3)
RBC # BLD: 4.08 M/UL — LOW (ref 4.2–5.8)
RBC # FLD: 13.5 % — SIGNIFICANT CHANGE UP (ref 10.3–14.5)
SODIUM SERPL-SCNC: 144 MMOL/L — SIGNIFICANT CHANGE UP (ref 135–145)
WBC # BLD: 4.95 K/UL — SIGNIFICANT CHANGE UP (ref 3.8–10.5)
WBC # FLD AUTO: 4.95 K/UL — SIGNIFICANT CHANGE UP (ref 3.8–10.5)

## 2019-08-02 PROCEDURE — 83735 ASSAY OF MAGNESIUM: CPT

## 2019-08-02 PROCEDURE — 82553 CREATINE MB FRACTION: CPT

## 2019-08-02 PROCEDURE — 80048 BASIC METABOLIC PNL TOTAL CA: CPT

## 2019-08-02 PROCEDURE — 86803 HEPATITIS C AB TEST: CPT

## 2019-08-02 PROCEDURE — C1725: CPT

## 2019-08-02 PROCEDURE — C1894: CPT

## 2019-08-02 PROCEDURE — 36415 COLL VENOUS BLD VENIPUNCTURE: CPT

## 2019-08-02 PROCEDURE — 93010 ELECTROCARDIOGRAM REPORT: CPT

## 2019-08-02 PROCEDURE — C1874: CPT

## 2019-08-02 PROCEDURE — 93458 L HRT ARTERY/VENTRICLE ANGIO: CPT

## 2019-08-02 PROCEDURE — 94660 CPAP INITIATION&MGMT: CPT

## 2019-08-02 PROCEDURE — C1769: CPT

## 2019-08-02 PROCEDURE — 92928 PRQ TCAT PLMT NTRAC ST 1 LES: CPT

## 2019-08-02 PROCEDURE — 85610 PROTHROMBIN TIME: CPT

## 2019-08-02 PROCEDURE — C1887: CPT

## 2019-08-02 PROCEDURE — 82550 ASSAY OF CK (CPK): CPT

## 2019-08-02 PROCEDURE — 93571 IV DOP VEL&/PRESS C FLO 1ST: CPT

## 2019-08-02 PROCEDURE — 80061 LIPID PANEL: CPT

## 2019-08-02 PROCEDURE — 85730 THROMBOPLASTIN TIME PARTIAL: CPT

## 2019-08-02 PROCEDURE — 85027 COMPLETE CBC AUTOMATED: CPT

## 2019-08-02 PROCEDURE — 83036 HEMOGLOBIN GLYCOSYLATED A1C: CPT

## 2019-08-02 PROCEDURE — 86140 C-REACTIVE PROTEIN: CPT

## 2019-08-02 PROCEDURE — 82962 GLUCOSE BLOOD TEST: CPT

## 2019-08-02 PROCEDURE — C1889: CPT

## 2019-08-02 PROCEDURE — 80053 COMPREHEN METABOLIC PANEL: CPT

## 2019-08-02 PROCEDURE — 99239 HOSP IP/OBS DSCHRG MGMT >30: CPT

## 2019-08-02 PROCEDURE — 85025 COMPLETE CBC W/AUTO DIFF WBC: CPT

## 2019-08-02 PROCEDURE — 93005 ELECTROCARDIOGRAM TRACING: CPT

## 2019-08-02 RX ORDER — MAGNESIUM OXIDE 400 MG ORAL TABLET 241.3 MG
400 TABLET ORAL ONCE
Refills: 0 | Status: COMPLETED | OUTPATIENT
Start: 2019-08-02 | End: 2019-08-02

## 2019-08-02 RX ORDER — ASPIRIN/CALCIUM CARB/MAGNESIUM 324 MG
1 TABLET ORAL
Qty: 0 | Refills: 0 | DISCHARGE

## 2019-08-02 RX ORDER — METFORMIN HYDROCHLORIDE 850 MG/1
1 TABLET ORAL
Qty: 0 | Refills: 0 | DISCHARGE

## 2019-08-02 RX ORDER — CLOPIDOGREL BISULFATE 75 MG/1
1 TABLET, FILM COATED ORAL
Qty: 30 | Refills: 11
Start: 2019-08-02 | End: 2020-07-26

## 2019-08-02 RX ORDER — POTASSIUM CHLORIDE 20 MEQ
40 PACKET (EA) ORAL EVERY 4 HOURS
Refills: 0 | Status: COMPLETED | OUTPATIENT
Start: 2019-08-02 | End: 2019-08-02

## 2019-08-02 RX ORDER — ASPIRIN/CALCIUM CARB/MAGNESIUM 324 MG
1 TABLET ORAL
Qty: 30 | Refills: 11
Start: 2019-08-02 | End: 2020-07-26

## 2019-08-02 RX ADMIN — PANTOPRAZOLE SODIUM 40 MILLIGRAM(S): 20 TABLET, DELAYED RELEASE ORAL at 07:09

## 2019-08-02 RX ADMIN — Medication 650 MILLIGRAM(S): at 00:25

## 2019-08-02 RX ADMIN — ATENOLOL 25 MILLIGRAM(S): 25 TABLET ORAL at 07:09

## 2019-08-02 RX ADMIN — SERTRALINE 200 MILLIGRAM(S): 25 TABLET, FILM COATED ORAL at 11:48

## 2019-08-02 RX ADMIN — Medication 100 MILLIGRAM(S): at 11:47

## 2019-08-02 RX ADMIN — Medication 650 MILLIGRAM(S): at 01:20

## 2019-08-02 RX ADMIN — CLOPIDOGREL BISULFATE 75 MILLIGRAM(S): 75 TABLET, FILM COATED ORAL at 11:47

## 2019-08-02 RX ADMIN — MAGNESIUM OXIDE 400 MG ORAL TABLET 400 MILLIGRAM(S): 241.3 TABLET ORAL at 09:09

## 2019-08-02 RX ADMIN — Medication 81 MILLIGRAM(S): at 11:47

## 2019-08-02 RX ADMIN — Medication 40 MILLIEQUIVALENT(S): at 11:48

## 2019-08-02 RX ADMIN — Medication 40 MILLIEQUIVALENT(S): at 09:10

## 2019-08-02 RX ADMIN — GABAPENTIN 400 MILLIGRAM(S): 400 CAPSULE ORAL at 11:48

## 2019-08-02 NOTE — PROGRESS NOTE ADULT - SUBJECTIVE AND OBJECTIVE BOX
INTERVENTIONAL CARDIOLOGY FOLLOW UP NOTE    -Patient seen and examined this am  -No events overnight  -No complaints this am    T(C): 36.6 (08-02-19 @ 05:01), Max: 36.8 (08-01-19 @ 20:31)  HR: 68 (08-02-19 @ 08:34) (58 - 72)  BP: 119/65 (08-02-19 @ 08:34) (116/72 - 145/73)  RR: 18 (08-02-19 @ 08:34) (16 - 18)  SpO2: 96% (08-02-19 @ 08:34) (94% - 99%)    VASCULAR ACCESS EXAM:     RADIAL:   2+ right/left radial pulse   Normal capillary refill. No evidence of motor or sensory deficits of digits, hand, wrist or forearm.   -Access site clean, non-tender, without ecchymosis or hematoma.    A/P  S/p PCI via radial approach with no evidence of vascular complications post procedure.     -continue with current medications including dual antiplatelet therapy   -discharge instructions as per protocol   -outpatient follow up with primary cardiologist INTERVENTIONAL CARDIOLOGY FOLLOW UP NOTE    -Patient seen and examined this am  -No events overnight  -No complaints this am    T(C): 36.6 (08-02-19 @ 05:01), Max: 36.8 (08-01-19 @ 20:31)  HR: 68 (08-02-19 @ 08:34) (58 - 72)  BP: 119/65 (08-02-19 @ 08:34) (116/72 - 145/73)  RR: 18 (08-02-19 @ 08:34) (16 - 18)  SpO2: 96% (08-02-19 @ 08:34) (94% - 99%)    VASCULAR ACCESS EXAM:     RADIAL:   2+ right radial pulse   Normal capillary refill. No evidence of motor or sensory deficits of digits, hand, wrist or forearm.   -Access site clean, non-tender, without ecchymosis or hematoma.    A/P  S/p PCI via radial approach with no evidence of vascular complications post procedure.     -continue with current medications including dual antiplatelet therapy   -discharge instructions as per protocol   -outpatient follow up with primary cardiologist

## 2019-08-02 NOTE — DISCHARGE NOTE NURSING/CASE MANAGEMENT/SOCIAL WORK - NSDCDPATPORTLINK_GEN_ALL_CORE
You can access the [facility] Patient Portal, offered by [facility], by registering with the following website: [email or web address]

## 2019-08-02 NOTE — DISCHARGE NOTE PROVIDER - HOSPITAL COURSE
72 y/o Male with a FHx of CAD and a PMHx of HTN, HLD, DM-2, GERD, JOVANNY (on CPAP at home), BPH s/p Alight Scraping, PTSD, and CAD who presented to his Cardiologist, Dr. Guillaume, with c/o sharp Left sided chest pain with associated Right arm paresthesias, palpitations, dyspnea on exertion (upon ambulation of one flight/blocks), and h/o syncopal episodes (most recently a year ago), with subsequent CTA Coronaries 7/22/2019: revealing LM: 50%, pLCx: severe calcific stenosis at [place], OM2: severe stenosis at [place], LAD: moderate, mLCx: small, OM1: minimal stenosis high takeoff, pRCA: mild, mRCA: moderate, dRCA: mild, RPDA: minimal, RPL: normal, Calcium Score 1145 (94%). Patient denies diaphoresis, fatigue, dizziness, lower extremity edema, orthopnea, positional nocturnal dyspnea, recent fever, chills, night sweats, nausea, vomiting, and diarrhea. In light of patient's risk factors, CCS Class III symptoms, and abnormal CTA Coronaries, patient was referred to [facility] for Cardiac Catheterization with possible intervention if clinically indicated.    Pt s/p cardiac catheterization, receiving LELA to ostial LCx and LELA OM@ with residual mLAD 50%, dRCA 30%, EF 55%, EDP 10, access R radial. Pt seen and examined at bedside this AM without any complaints or events overnight. VSS, labs and telemetry reviewed and pt stable for discharge as discussed with Dr. Lopez. Pt has been given appropriate discharge instructions, including medication regimen, access site management and proper follow up with Dr. Guillaume in 1-2 weeks. Medications have been e-prescribed to pts preferred to pharmacy.        PHYSICAL EXAM:    resp- lungs CTA B/L, no wheezes/rhonchi/rales    card- RRR no murmurs/rubs/gallops    abd- soft non distended non tender, BS+    ext- R radial access site stable, non tender without ecchymosis, bleeding or hematoma, pulse 2+

## 2019-08-02 NOTE — DISCHARGE NOTE PROVIDER - NSDCCPCAREPLAN_GEN_ALL_CORE_FT
PRINCIPAL DISCHARGE DIAGNOSIS  Diagnosis: CAD (coronary artery disease)  Assessment and Plan of Treatment: You were found to have blockages in the arteries of your heart, also known as coronary artery disease. You underwent a cardiac angiogram and received a stent to the Left Circumflex artery and a stent to the Obtuse Marginal artery. PLEASE CONTINUE ASPIRIN 81MG DAILY AND PLAVIX 75MG DAILY. DO NOT STOP THESE MEDICATIONS FOR ANY REASON AS THEY ARE KEEPING YOUR STENT OPEN AND PREVENTING A HEART ATTACK. Avoid strenuous activity or heavy lifting for the next five days. Do not take a bath or swim for the next five days; you may shower. For any bleeding or hematoma formation (hardened blood collection under the skin) at the access site of your right wrist please hold pressure and go to the emergency room. Please follow up with Dr. Guillaume in 1-2 weeks. For recurrent chest pain, please call your doctor or go to the emergency room.      SECONDARY DISCHARGE DIAGNOSES  Diagnosis: HTN (hypertension)  Assessment and Plan of Treatment: Please continue your medications as listed to keep your blood pressure controlled. For blood pressure that is too high or too low please see your doctor or go to the emergency room as necessary.    Diagnosis: HLD (hyperlipidemia)  Assessment and Plan of Treatment: Please continue Atorvastatin to keep your cholesterol low. High cholesterol contributes to heart disease.    Diagnosis: DM (diabetes mellitus)  Assessment and Plan of Treatment: Please continue your medications as listed for diabetes. Maintain a low carbohydrate, low sugar diet. Check for your blood sugars regularly. PRINCIPAL DISCHARGE DIAGNOSIS  Diagnosis: CAD (coronary artery disease)  Assessment and Plan of Treatment: You were found to have blockages in the arteries of your heart, also known as coronary artery disease. You underwent a cardiac angiogram and received a stent to the Left Circumflex artery and a stent to the Obtuse Marginal artery. PLEASE CONTINUE ASPIRIN 81MG DAILY AND PLAVIX 75MG DAILY. DO NOT STOP THESE MEDICATIONS FOR ANY REASON AS THEY ARE KEEPING YOUR STENT OPEN AND PREVENTING A HEART ATTACK. Avoid strenuous activity or heavy lifting for the next five days. Do not take a bath or swim for the next five days; you may shower. For any bleeding or hematoma formation (hardened blood collection under the skin) at the access site of your right wrist please hold pressure and go to the emergency room. Please follow up with Dr. Guillaume in 1-2 weeks. For recurrent chest pain, please call your doctor or go to the emergency room.      SECONDARY DISCHARGE DIAGNOSES  Diagnosis: HTN (hypertension)  Assessment and Plan of Treatment: Please continue your medications as listed to keep your blood pressure controlled. For blood pressure that is too high or too low please see your doctor or go to the emergency room as necessary.    Diagnosis: HLD (hyperlipidemia)  Assessment and Plan of Treatment: Please continue Atorvastatin to keep your cholesterol low. High cholesterol contributes to heart disease.    Diagnosis: DM (diabetes mellitus)  Assessment and Plan of Treatment: Please hold your Metformin and restart it on 8/4/19 as listed for diabetes. Maintain a low carbohydrate, low sugar diet. Check for your blood sugars regularly.

## 2019-08-02 NOTE — DISCHARGE NOTE PROVIDER - CARE PROVIDER_API CALL
Christo Forman)  Internal Medicine  [address] [place] [address]  Phone: [phone]  Fax: [phone]  Follow Up Time: 2 weeks

## 2020-08-07 ENCOUNTER — APPOINTMENT (RX ONLY)
Dept: URBAN - METROPOLITAN AREA CLINIC 160 | Facility: CLINIC | Age: 75
Setting detail: DERMATOLOGY
End: 2020-08-07

## 2020-08-07 DIAGNOSIS — L73.9 FOLLICULAR DISORDER, UNSPECIFIED: ICD-10-CM

## 2020-08-07 DIAGNOSIS — L663 OTHER SPECIFIED DISEASES OF HAIR AND HAIR FOLLICLES: ICD-10-CM

## 2020-08-07 DIAGNOSIS — L738 OTHER SPECIFIED DISEASES OF HAIR AND HAIR FOLLICLES: ICD-10-CM

## 2020-08-07 PROBLEM — L02.02 FURUNCLE OF FACE: Status: ACTIVE | Noted: 2020-08-07

## 2020-08-07 PROBLEM — L02.821 FURUNCLE OF HEAD [ANY PART, EXCEPT FACE]: Status: ACTIVE | Noted: 2020-08-07

## 2020-08-07 PROCEDURE — ? COUNSELING

## 2020-08-07 PROCEDURE — 99202 OFFICE O/P NEW SF 15 MIN: CPT

## 2020-08-07 PROCEDURE — ? PRESCRIPTION

## 2020-08-07 PROCEDURE — ? ADDITIONAL NOTES

## 2020-08-07 RX ORDER — CLINDAMYCIN PHOSPHATE 10 MG/ML
SOLUTION TOPICAL
Qty: 1 | Refills: 2 | Status: ERX | COMMUNITY
Start: 2020-08-07

## 2020-08-07 RX ORDER — AZELAIC ACID 0.15 G/G
GEL TOPICAL
Qty: 1 | Refills: 2 | Status: ERX | COMMUNITY
Start: 2020-08-07

## 2020-08-07 RX ADMIN — AZELAIC ACID: 0.15 GEL TOPICAL at 00:00

## 2020-08-07 RX ADMIN — CLINDAMYCIN PHOSPHATE: 10 SOLUTION TOPICAL at 00:00

## 2020-08-07 ASSESSMENT — LOCATION DETAILED DESCRIPTION DERM
LOCATION DETAILED: LEFT MID TEMPLE
LOCATION DETAILED: RIGHT MID TEMPLE
LOCATION DETAILED: POSTERIOR MID-PARIETAL SCALP
LOCATION DETAILED: SUPERIOR MID FOREHEAD

## 2020-08-07 ASSESSMENT — LOCATION SIMPLE DESCRIPTION DERM
LOCATION SIMPLE: SUPERIOR FOREHEAD
LOCATION SIMPLE: RIGHT TEMPLE
LOCATION SIMPLE: LEFT TEMPLE
LOCATION SIMPLE: POSTERIOR SCALP

## 2020-08-07 ASSESSMENT — LOCATION ZONE DERM
LOCATION ZONE: FACE
LOCATION ZONE: SCALP

## 2020-08-07 NOTE — PROCEDURE: MIPS QUALITY
Quality 226: Preventive Care And Screening: Tobacco Use: Screening And Cessation Intervention: Patient screened for tobacco use and is an ex/non-smoker
Quality 111:Pneumonia Vaccination Status For Older Adults: Pneumococcal Vaccination Previously Received
Quality 130: Documentation Of Current Medications In The Medical Record: Current Medications Documented
Detail Level: Detailed
Quality 431: Preventive Care And Screening: Unhealthy Alcohol Use - Screening: Patient screened for unhealthy alcohol use using a single question and scores less than 2 times per year
Quality 110: Preventive Care And Screening: Influenza Immunization: Influenza Immunization Administered during Influenza season
Quality 47: Advance Care Plan: Advance Care Planning discussed and documented; advance care plan or surrogate decision maker documented in the medical record.

## 2020-08-07 NOTE — PROCEDURE: ADDITIONAL NOTES
Detail Level: Simple
Additional Notes: Patient consent was obtained to proceed with the visit and recommended plan of care after discussion of all risks and benefits, including the risks of COVID-19 exposure
Detail Level: Generalized
Additional Notes: Advised facial

## 2020-08-21 ENCOUNTER — RX ONLY (OUTPATIENT)
Age: 75
Setting detail: RX ONLY
End: 2020-08-21

## 2020-08-21 RX ORDER — METRONIDAZOLE 7.5 MG/G
CREAM TOPICAL
Qty: 1 | Refills: 0 | Status: ERX | COMMUNITY
Start: 2020-08-21

## 2020-09-10 ENCOUNTER — APPOINTMENT (RX ONLY)
Dept: URBAN - METROPOLITAN AREA CLINIC 160 | Facility: CLINIC | Age: 75
Setting detail: DERMATOLOGY
End: 2020-09-10

## 2020-09-10 VITALS — TEMPERATURE: 96.6 F

## 2020-09-10 DIAGNOSIS — L73.9 FOLLICULAR DISORDER, UNSPECIFIED: ICD-10-CM | Status: IMPROVED

## 2020-09-10 DIAGNOSIS — L663 OTHER SPECIFIED DISEASES OF HAIR AND HAIR FOLLICLES: ICD-10-CM | Status: IMPROVED

## 2020-09-10 DIAGNOSIS — L738 OTHER SPECIFIED DISEASES OF HAIR AND HAIR FOLLICLES: ICD-10-CM | Status: IMPROVED

## 2020-09-10 PROBLEM — L02.02 FURUNCLE OF FACE: Status: ACTIVE | Noted: 2020-09-10

## 2020-09-10 PROBLEM — L02.821 FURUNCLE OF HEAD [ANY PART, EXCEPT FACE]: Status: ACTIVE | Noted: 2020-09-10

## 2020-09-10 PROCEDURE — ? ORDER TESTS

## 2020-09-10 PROCEDURE — ? ADDITIONAL NOTES

## 2020-09-10 PROCEDURE — 99212 OFFICE O/P EST SF 10 MIN: CPT

## 2020-09-10 PROCEDURE — ? COUNSELING

## 2020-09-10 PROCEDURE — ? PRESCRIPTION

## 2020-09-10 RX ORDER — CLINDAMYCIN PHOSPHATE 10 MG/ML
SOLUTION TOPICAL
Qty: 1 | Refills: 2 | Status: ERX

## 2020-09-10 ASSESSMENT — LOCATION DETAILED DESCRIPTION DERM
LOCATION DETAILED: POSTERIOR MID-PARIETAL SCALP
LOCATION DETAILED: RIGHT MID TEMPLE
LOCATION DETAILED: SUPERIOR MID FOREHEAD
LOCATION DETAILED: LEFT MID TEMPLE

## 2020-09-10 ASSESSMENT — LOCATION SIMPLE DESCRIPTION DERM
LOCATION SIMPLE: RIGHT TEMPLE
LOCATION SIMPLE: SUPERIOR FOREHEAD
LOCATION SIMPLE: POSTERIOR SCALP
LOCATION SIMPLE: LEFT TEMPLE

## 2020-09-10 ASSESSMENT — LOCATION ZONE DERM
LOCATION ZONE: SCALP
LOCATION ZONE: FACE

## 2020-09-10 NOTE — PROCEDURE: ADDITIONAL NOTES
Detail Level: Generalized
Additional Notes: Again, advised facial for comedowns on temple.
Detail Level: Simple
Additional Notes: Patient consent was obtained to proceed with the visit and recommended plan of care after discussion of all risks and benefits, including the risks of COVID-19 exposure

## 2023-06-16 NOTE — PATIENT PROFILE ADULT - NSPROGENBLOODRESTRICT_GEN_A_NUR
Pt left message for a call back on Mon  Hs not heard back yet  Has updates to review and Chanel was going to send her a link re donors   
none

## 2024-02-06 NOTE — H&P ADULT - HEART RATE (BEATS/MIN)
Caller: Lindsay Amezquita    Relationship: Self    Best call back number: [phone]     Requested Prescriptions:   Requested Prescriptions     Pending Prescriptions Disp Refills    tiZANidine (ZANAFLEX) 4 MG tablet 90 tablet 0     Sig: Take 1 tablet by mouth Every 8 (Eight) Hours As Needed for Muscle Spasms. Further refills should come from pain management.        Pharmacy where request should be sent: [id number] - [place], [place] - [address] [place] [address] [phone] [place] [phone]      Last office visit with prescribing clinician: 1/5/2024   Last telemedicine visit with prescribing clinician: Visit date not found   Next office visit with prescribing clinician: 2/16/2024     Additional details provided by patient: PATIENT IS REQUESTING THIS BECAUSE THE PAIN MANAGEMENT DOCTORS WERE NOT SEEING HER AS PATIENT ANYMORE, AND THE DOCTORS WERE NOT GOING TO GIVE HER ANYMORE PAIN MEDICATION. PATIENT IS REQUESTING A REFILL ON THIS MEDICATION.     Does the patient have less than a 3 day supply:  [x] Yes  [] No    Would you like a call back once the refill request has been completed: [] Yes [x] No    If the office needs to give you a call back, can they leave a voicemail: [x] Yes [] No    Kim Oneil   02/06/24 12:49 EST    53